# Patient Record
Sex: FEMALE
[De-identification: names, ages, dates, MRNs, and addresses within clinical notes are randomized per-mention and may not be internally consistent; named-entity substitution may affect disease eponyms.]

---

## 2021-03-29 PROBLEM — Z00.00 ENCOUNTER FOR PREVENTIVE HEALTH EXAMINATION: Status: ACTIVE | Noted: 2021-03-29

## 2021-04-02 ENCOUNTER — APPOINTMENT (OUTPATIENT)
Dept: OTOLARYNGOLOGY | Facility: CLINIC | Age: 71
End: 2021-04-02

## 2021-04-12 ENCOUNTER — APPOINTMENT (OUTPATIENT)
Dept: OTOLARYNGOLOGY | Facility: CLINIC | Age: 71
End: 2021-04-12
Payer: MEDICARE

## 2021-04-12 VITALS
HEART RATE: 92 BPM | HEIGHT: 63 IN | DIASTOLIC BLOOD PRESSURE: 88 MMHG | BODY MASS INDEX: 26.58 KG/M2 | SYSTOLIC BLOOD PRESSURE: 164 MMHG | WEIGHT: 150 LBS | TEMPERATURE: 97 F

## 2021-04-12 DIAGNOSIS — Z78.9 OTHER SPECIFIED HEALTH STATUS: ICD-10-CM

## 2021-04-12 PROCEDURE — 99204 OFFICE O/P NEW MOD 45 MIN: CPT | Mod: 25

## 2021-04-12 PROCEDURE — 31575 DIAGNOSTIC LARYNGOSCOPY: CPT

## 2021-04-12 RX ORDER — FAMOTIDINE 20 MG/1
20 TABLET, FILM COATED ORAL
Qty: 60 | Refills: 5 | Status: ACTIVE | COMMUNITY
Start: 2021-04-12 | End: 1900-01-01

## 2021-04-12 RX ORDER — PSYLLIUM HUSK 0.4 G
CAPSULE ORAL
Refills: 0 | Status: ACTIVE | COMMUNITY

## 2021-04-12 RX ORDER — MULTIVIT-MIN/FA/LYCOPEN/LUTEIN .4-300-25
TABLET ORAL
Refills: 0 | Status: ACTIVE | COMMUNITY

## 2021-04-12 RX ORDER — METOPROLOL SUCCINATE 25 MG/1
25 TABLET, EXTENDED RELEASE ORAL
Refills: 0 | Status: ACTIVE | COMMUNITY

## 2021-04-12 RX ORDER — VITAMIN B COMPLEX
CAPSULE ORAL
Refills: 0 | Status: ACTIVE | COMMUNITY

## 2021-04-12 NOTE — ASSESSMENT
[FreeTextEntry1] : Reviewed and reconciled medications, allergies, PMHx, PSHx, SocHx, FMHx.\par \par painful swallowing\par mild postcricoid edema\par \par CT neck 3/29/21: normal soft tissues, cervical spondylosis, foraminal stenosis C5-C6 bilaterally and C6-C7 on the right\par \par Plan:\par Reviewed CT results. Flexible laryngoscopy. Pepcid BID 30 minutes after other medications, before meals. FEEST in 2 weeks. FU after test.

## 2021-04-12 NOTE — PROCEDURE
[de-identified] : Procedure:  Flexible Fiberoptic Laryngoscopy: Risks, benefits, and alternatives of flexible endoscopy were explained to the patient.  The patient gave oral consent to proceed.  The flexible scope was inserted into the right nasal cavity and advanced towards the nasopharynx.  Visualized mucosa over the turbinates and septum were as described above.  The nasopharynx was clear.  Oropharyngeal walls were symmetric and mobile without lesion, mass, or edema.  Hypopharynx was also without  lesion or edema.  Larynx was mobile without lesions. Mild postcricoid edema.  True vocal folds were white without mass or lesion.  Base of tongue was within normal limits.

## 2021-04-12 NOTE — ADDENDUM
[FreeTextEntry1] : Documented by Nichol Estevez acting as scribe for Dr. Plascencia on 04/12/2021.\par \par All Medical record entries made by the Scribe were at my, Dr. Plascencia, direction and personally dictated by me on 04/12/2021 . I have reviewed the chart and agree that the record accurately reflects my personal performance of the history, physical exam, assessment and plan. I have also personally directed, reviewed, and agreed with the discharge instructions.

## 2021-04-12 NOTE — CONSULT LETTER
[Dear  ___] : Dear  [unfilled], [Consult Letter:] : I had the pleasure of evaluating your patient, [unfilled]. [Please see my note below.] : Please see my note below. [Sincerely,] : Sincerely, [Consult Closing:] : Thank you very much for allowing me to participate in the care of this patient.  If you have any questions, please do not hesitate to contact me. [FreeTextEntry3] : Tono Plascencia MD FACS

## 2021-04-12 NOTE — PHYSICAL EXAM
[Midline] : trachea located in midline position [Removed] : palatine tonsils previously removed [Normal] : no rashes [FreeTextEntry1] : open and clear bilaterally [FreeTextEntry2] : sinuses nontender to percussion  [de-identified] : sensations intact

## 2021-04-12 NOTE — HISTORY OF PRESENT ILLNESS
[de-identified] : The patient presents with painful swallowing for the past 3-4 weeks. Denies choking or trauma to the area. Denies dysphagia or change in voice. She had a carotid study done and was sent for a CT. She was having chest pain last week and reflux and a small hernia were found.

## 2021-04-12 NOTE — REVIEW OF SYSTEMS
[Throat Pain] : throat pain [Throat Dryness] : throat dryness [Throat Itching] : throat itching [Swelling Neck] : swelling neck [Swelling Face] : face swelling [Eyes Itch] : itching of the eyes [Swollen Glands] : swollen glands [Negative] : Endocrine [FreeTextEntry7] : difficulty swallowing

## 2021-04-26 ENCOUNTER — APPOINTMENT (OUTPATIENT)
Dept: OTOLARYNGOLOGY | Facility: CLINIC | Age: 71
End: 2021-04-26
Payer: MEDICARE

## 2021-04-26 PROCEDURE — 92612 ENDOSCOPY SWALLOW (FEES) VID: CPT | Mod: GN

## 2021-05-05 ENCOUNTER — APPOINTMENT (OUTPATIENT)
Dept: OTOLARYNGOLOGY | Facility: CLINIC | Age: 71
End: 2021-05-05
Payer: MEDICARE

## 2021-05-05 VITALS
TEMPERATURE: 97.2 F | DIASTOLIC BLOOD PRESSURE: 78 MMHG | BODY MASS INDEX: 26.58 KG/M2 | SYSTOLIC BLOOD PRESSURE: 174 MMHG | HEART RATE: 81 BPM | HEIGHT: 63 IN | WEIGHT: 150 LBS

## 2021-05-05 DIAGNOSIS — J31.0 CHRONIC RHINITIS: ICD-10-CM

## 2021-05-05 DIAGNOSIS — K21.9 GASTRO-ESOPHAGEAL REFLUX DISEASE W/OUT ESOPHAGITIS: ICD-10-CM

## 2021-05-05 DIAGNOSIS — R13.10 DYSPHAGIA, UNSPECIFIED: ICD-10-CM

## 2021-05-05 DIAGNOSIS — R07.0 PAIN IN THROAT: ICD-10-CM

## 2021-05-05 DIAGNOSIS — M54.12 RADICULOPATHY, CERVICAL REGION: ICD-10-CM

## 2021-05-05 PROCEDURE — 99214 OFFICE O/P EST MOD 30 MIN: CPT

## 2021-05-05 NOTE — HISTORY OF PRESENT ILLNESS
[de-identified] : The patient presents with h/o painful swallowing and reflux. She hasn't been taking the Pepcid regularly. She hasn't been following the reflux diet. She is still getting painful swallowing and she now has excess mucus. \par

## 2021-05-05 NOTE — CONSULT LETTER
[Dear  ___] : Dear  [unfilled], [Courtesy Letter:] : I had the pleasure of seeing your patient, [unfilled], in my office today. [Please see my note below.] : Please see my note below. [Consult Closing:] : Thank you very much for allowing me to participate in the care of this patient.  If you have any questions, please do not hesitate to contact me. [Sincerely,] : Sincerely, [FreeTextEntry3] : Tono Plascencia MD FACS

## 2021-05-05 NOTE — ADDENDUM
[FreeTextEntry1] : Documented by Nichol Estevez acting as scribe for Dr. Plascencia on 05/05/2021.\par \par All Medical record entries made by the Scribe were at my, Dr. Plascencia, direction and personally dictated by me on 05/05/2021 . I have reviewed the chart and agree that the record accurately reflects my personal performance of the history, physical exam, assessment and plan. I have also personally directed, reviewed, and agreed with the discharge instructions.

## 2021-05-05 NOTE — ASSESSMENT
[FreeTextEntry1] : Reviewed and reconciled medications, allergies, PMHx, PSHx, SocHx, FMHx.\par \par h/o painful swallowing and reflux\par excess mucus\par \par CT neck 3/29/21: normal soft tissues, cervical spondylosis, foraminal stenosis C5-C6 bilaterally and C6-C7 on the right\par \par FEEST 4/26/21: evidence of intermittent, trace LPR extending into the right pyriform sinus\par \par Plan:\par Reviewed CT and FEEST results. Reflux diet- no eating 2 hours before bed. Pepcid BID 30 minutes after other medications, before meals. MBS in 3 weeks if no improvement. FU after test.

## 2024-04-18 ENCOUNTER — APPOINTMENT (OUTPATIENT)
Dept: ORTHOPEDIC SURGERY | Facility: CLINIC | Age: 74
End: 2024-04-18
Payer: MEDICARE

## 2024-04-18 VITALS — BODY MASS INDEX: 26.58 KG/M2 | HEIGHT: 63 IN | WEIGHT: 150 LBS

## 2024-04-18 DIAGNOSIS — Z78.9 OTHER SPECIFIED HEALTH STATUS: ICD-10-CM

## 2024-04-18 DIAGNOSIS — M43.16 SPONDYLOLISTHESIS, LUMBAR REGION: ICD-10-CM

## 2024-04-18 PROCEDURE — 72170 X-RAY EXAM OF PELVIS: CPT

## 2024-04-18 PROCEDURE — 72110 X-RAY EXAM L-2 SPINE 4/>VWS: CPT

## 2024-04-18 PROCEDURE — 99204 OFFICE O/P NEW MOD 45 MIN: CPT

## 2024-04-18 RX ORDER — TIZANIDINE 4 MG/1
4 TABLET ORAL TWICE DAILY
Qty: 20 | Refills: 0 | Status: ACTIVE | COMMUNITY
Start: 2024-04-18 | End: 1900-01-01

## 2024-04-18 RX ORDER — METHYLPREDNISOLONE 4 MG/1
4 TABLET ORAL
Qty: 1 | Refills: 0 | Status: ACTIVE | COMMUNITY
Start: 2024-04-18 | End: 1900-01-01

## 2024-04-22 NOTE — ASSESSMENT
[FreeTextEntry1] : 74 yo F presenting with acute low back and RLE radicular pain. XR with L4-5 spondylolisthesis. Will start PT. TPI offered and deferred. Prescribing tizanidine and mdp - unable to tolerate nsaids, defers narcotics and tpi. If symptoms persist despite this will get MRI LS for further eval Progress note completed by Lavelle Goncalves MD

## 2024-04-22 NOTE — HISTORY OF PRESENT ILLNESS
[Lower back] : lower back [8] : 8 [Burning] : burning [Radiating] : radiating [Shooting] : shooting [Throbbing] : throbbing [Tightness] : tightness [Constant] : constant [Nothing helps with pain getting better] : Nothing helps with pain getting better [de-identified] : 74 yo F presenting with back and RLE pain x 3 weeks, no injury. Pain radiates with posterior buttock pain, posteriolateral leg pain to ankle with n/t. Pain with standing/walking to long. Getting out of bed aggravates. Tried salon pas. Topicals. advil/tylenol without relief. NO prior PT injections or spinal surgeries.  [] : no [FreeTextEntry5] : 72 yo F presenting with low back pain that radiates down right leg. Started 3 weeks ago; gotten worse over the past 3 days ago.  [FreeTextEntry7] : right leg

## 2024-04-22 NOTE — IMAGING
[Spondylolithesis] : Spondylolithesis [de-identified] : L lumbar paraspinal muscle tenderness L lumbar paraspinal muscle spasm   pain with flexion radicular pain with extension diminished ROM in all planes   motor exam 5/5 strength bilaterally sensory intact + R SLR R [id number] of pain   ambulates without assistive device non antalgic gait able to heel/toe walk      [FreeTextEntry1] : L4-5 spondylolisthesis [de-identified] : negative

## 2024-05-10 ENCOUNTER — APPOINTMENT (OUTPATIENT)
Dept: PAIN MANAGEMENT | Facility: CLINIC | Age: 74
End: 2024-05-10

## 2024-05-13 ENCOUNTER — APPOINTMENT (OUTPATIENT)
Dept: PAIN MANAGEMENT | Facility: CLINIC | Age: 74
End: 2024-05-13
Payer: MEDICARE

## 2024-05-13 VITALS — WEIGHT: 156 LBS | HEIGHT: 63 IN | BODY MASS INDEX: 27.64 KG/M2

## 2024-05-13 PROCEDURE — 99214 OFFICE O/P EST MOD 30 MIN: CPT | Mod: 25

## 2024-05-13 PROCEDURE — J3490M: CUSTOM | Mod: NC

## 2024-05-13 PROCEDURE — 20552 NJX 1/MLT TRIGGER POINT 1/2: CPT

## 2024-05-13 NOTE — DISCUSSION/SUMMARY
[de-identified] : Patient is presenting with acute/sub-acute radicular pain with impairment in ADLs and functionality.  The pain has not responded to  conservative care including nsaid therapy and/or physical therapy.   will proceed with mri and tpi today

## 2024-05-13 NOTE — PROCEDURE
[FreeTextEntry3] : Trigger Point was performed because of pain inflammation Anesthesia: ethyl chloride sprayed topically.: Lidocaine .1% 2 cc Marcaine:.25% 2cc  kenalog 10mg/cc 2cc :Needle size: 25 gauge 1 1/2inch.  Medication was injected in the right paraspinal muscles . Patient has tried OTC's including aspirin, Ibuprofen, Aleve etc or prescription NSAIDS, and/or exercises at home and/ or physical therapy without satisfactory response After verbal consent using sterile preparation and technique.The risks, benefits, and alternatives to cortisone injection were explained in full to the patient. Risks outlined include but are not limited to infection, sepsis, bleeding, scarring, skin discoloration, temporary increase in pain, syncopal episode, failure to resolve symptoms, allergic reaction, symptom recurrence, and elevation of blood sugar in diabetics. Patient understood the risks. All questions were answered. After discussion of options, patient requested an injection. Oral informed consent was obtained and sterile prep was done of the injection site. Sterile technique was utilized for the procedure including the preparation of the solutions used for the injection. Patient tolerated the procedure well. Advised to ice the injection site this evening.  Sterile technique used Prep with alcohol locally to site.

## 2024-05-13 NOTE — PHYSICAL EXAM
[de-identified] : PHYSICAL EXAM  Constitutional:  Appears well, no apparent distress Ability to communicate: Normal Respiratory: non-labored breathing Skin: no rash noted Head: normocephalic, atraumatic Neck: no visible thyroid enlargement Eyes: extraocular movements intact Neurologic: alert and oriented x3 Psychiatric: normal mood, affect, and behavior  Lumbar Spine:  Palpation: right lumbar paraspinal spasm and right lumbar paraspinal tenderness to palpation. ROM: Diminished range of motion in all plains.  Patient notes pain with lateral bending to the right. MMT: Motor exam is 5/5 through out bilateral lower extremities. Sensation: Light touch and pain is intact throughout bilateral lower extremities. Reflexes: achilles and patella reflexes are intact and  symmetrical.  No sustained clonus. Special Testing: Positive straight leg raise on the right side.  Assessemnt: Radiculopathy of lumbosacral region (M54.17) Myalgia (M79.10)  Plan: After discussing various treatment options with the patient including but not limited to oral medications, physical therapy, exercise modalities as well as interventional spinal injections, we have decided with the following plan: The patient is presenting with acute/sub-acute radicular pain with impairment in ADLs and functionality.  The pain has not responded to conservative care including NSAID therapy and/or physical therapy.  There is no bleeding tendency, unstable medical condition, or systemic infection  The risks, benefits and alternatives of the proposed procedure were explained in detail with the patient.  The risks outlined include but are not limited to infection, bleeding, post dural puncture headache, nerve injury, a temporary increase in pain, failure to resolve symptoms, allergic reaction, symptom recurrence, and possible elevation of blood sugar.  All questions were answered to patient's satisfaction and he/she verbalized an understanding.  Follow up 1-2 weeks post injection foe re-evaluation.  Continue home exercises, stretching, activity modification, physical therapy, and conservative care.

## 2024-05-13 NOTE — HISTORY OF PRESENT ILLNESS
[FreeTextEntry1] : pt presents w/ lspine pain radiating into right le [Lower back] : lower back [10] : 10 [9] : 9 [Burning] : burning [Dull/Aching] : dull/aching [Shooting] : shooting [Throbbing] : throbbing [Tightness] : tightness [Tingling] : tingling [Constant] : constant [Sleep] : sleep [Ice] : ice [Sitting] : sitting [Standing] : standing [Walking] : walking [Stairs] : stairs [Lying in bed] : lying in bed [] : no [FreeTextEntry7] : b/l legs [de-identified] : getting up in the morning

## 2024-05-16 ENCOUNTER — NON-APPOINTMENT (OUTPATIENT)
Age: 74
End: 2024-05-16

## 2024-05-21 RX ORDER — TRAMADOL HYDROCHLORIDE 200 MG/1
200 TABLET, EXTENDED RELEASE ORAL
Qty: 7 | Refills: 0 | Status: ACTIVE | COMMUNITY
Start: 2024-05-21 | End: 1900-01-01

## 2024-05-21 RX ORDER — TRAMADOL HYDROCHLORIDE 50 MG/1
50 TABLET, COATED ORAL
Qty: 28 | Refills: 0 | Status: ACTIVE | COMMUNITY
Start: 2024-05-13 | End: 1900-01-01

## 2024-05-25 ENCOUNTER — APPOINTMENT (OUTPATIENT)
Dept: PAIN MANAGEMENT | Facility: CLINIC | Age: 74
End: 2024-05-25

## 2024-05-30 ENCOUNTER — APPOINTMENT (OUTPATIENT)
Dept: ORTHOPEDIC SURGERY | Facility: CLINIC | Age: 74
End: 2024-05-30

## 2024-05-30 ENCOUNTER — APPOINTMENT (OUTPATIENT)
Dept: PAIN MANAGEMENT | Facility: CLINIC | Age: 74
End: 2024-05-30
Payer: MEDICARE

## 2024-05-30 PROCEDURE — 64483 NJX AA&/STRD TFRM EPI L/S 1: CPT | Mod: RT

## 2024-05-30 PROCEDURE — 64484 NJX AA&/STRD TFRM EPI L/S EA: CPT | Mod: 59,RT

## 2024-05-30 NOTE — PROCEDURE
[FreeTextEntry3] : Date of Service: 05/30/2024   Account: [id number]   Patient: PORFIRIO ISLAS   YOB: 1950   Age: 74 year     Surgeon: Binh Dalal M.D.   Assistant: None.   Pre-Operative Diagnosis: Lumbosacral radiculitis   Post Operative Diagnosis: Lumbosacral radiculitis   Procedure: Right L4-5, L5-S1 transforaminal epidural steroid injection under fluoroscopic guidance        This procedure was carried out using fluoroscopic guidance.  The risks and benefits of the procedure were discussed extensively with the patient.  The consent of the patient was obtained and the following procedure was performed. The patient was placed in the prone position on the fluoroscopic table and the lumbar area was prepped and draped in a sterile fashion.   The right L4-5 and L5-S1 neural foramen were identified on right oblique  "jatin dog" anatomical view at the 6 o' clock position using fluoroscopic guidance, and the area was marked. The overlying skin and subcutaneous structures were anesthetized using sterile technique with 1% Lidocaine.  A 22 gauge spinal needle was directed toward the inferior (6 o'clock) position of the pedicle, which formed the roof of the identified foramen.  Once in the epidural space, after negative aspiration for heme and CSF, 1cc of Omnipaque contrast was injected to confirm epidural location and assess filling defects and rule out intravascular needle placement.   The following contrast flow and epidurogram was observed: no intravascular or intrathecal flow pattern was noted.  No blood or CSF was aspirated. Omnipaque spread appeared to outline the right L4 and L5 nerve roots and spread medially into the epidural space.   After this, an injectate of 3 cc preservative free normal saline plus 40 mg of kenalog was injected in the epidural space at each of the two levels.   The needle was subsequently removed.  Vital signs remained normal.  Pulse oximeter was used throughout the procedure and the patient's pulse and oxygen saturation remained within normal limits.  The patient tolerated the procedure well.  There were no complications.  The patient was instructed to apply ice over the injection sites for twenty minutes every two hours for the next 24 to 48 hours.  The patient was also instructed to contact me immediately if there were any problems.     Binh Dalal M.D.

## 2024-06-19 ENCOUNTER — APPOINTMENT (OUTPATIENT)
Dept: PAIN MANAGEMENT | Facility: CLINIC | Age: 74
End: 2024-06-19
Payer: MEDICARE

## 2024-06-19 VITALS — BODY MASS INDEX: 27.64 KG/M2 | WEIGHT: 156 LBS | HEIGHT: 63 IN

## 2024-06-19 DIAGNOSIS — M54.16 RADICULOPATHY, LUMBAR REGION: ICD-10-CM

## 2024-06-19 PROCEDURE — 99213 OFFICE O/P EST LOW 20 MIN: CPT

## 2024-06-19 RX ORDER — TIZANIDINE 2 MG/1
2 TABLET ORAL
Qty: 60 | Refills: 0 | Status: ACTIVE | COMMUNITY
Start: 2024-06-19 | End: 1900-01-01

## 2024-06-19 RX ORDER — GABAPENTIN 300 MG/1
300 CAPSULE ORAL TWICE DAILY
Qty: 60 | Refills: 0 | Status: ACTIVE | COMMUNITY
Start: 2024-05-21 | End: 1900-01-01

## 2024-06-19 NOTE — PHYSICAL EXAM
[de-identified] : PHYSICAL EXAM  Constitutional:  Appears well, no apparent distress Ability to communicate: Normal Respiratory: non-labored breathing Skin: no rash noted Head: normocephalic, atraumatic Neck: no visible thyroid enlargement Eyes: extraocular movements intact Neurologic: alert and oriented x3 Psychiatric: normal mood, affect, and behavior  Lumbar Spine:  Palpation: right lumbar paraspinal spasm and right lumbar paraspinal tenderness to palpation. ROM: Diminished range of motion in all plains.  Patient notes pain with lateral bending to the right. MMT: Motor exam is 5/5 through out bilateral lower extremities. Sensation: Light touch and pain is intact throughout bilateral lower extremities. Reflexes: achilles and patella reflexes are intact and  symmetrical.  No sustained clonus. Special Testing: Positive straight leg raise on the right side.  Assessemnt: Radiculopathy of lumbosacral region (M54.17) Myalgia (M79.10)  Plan: After discussing various treatment options with the patient including but not limited to oral medications, physical therapy, exercise modalities as well as interventional spinal injections, we have decided with the following plan: The patient is presenting with acute/sub-acute radicular pain with impairment in ADLs and functionality.  The pain has not responded to conservative care including NSAID therapy and/or physical therapy.  There is no bleeding tendency, unstable medical condition, or systemic infection  The risks, benefits and alternatives of the proposed procedure were explained in detail with the patient.  The risks outlined include but are not limited to infection, bleeding, post dural puncture headache, nerve injury, a temporary increase in pain, failure to resolve symptoms, allergic reaction, symptom recurrence, and possible elevation of blood sugar.  All questions were answered to patient's satisfaction and he/she verbalized an understanding.  Follow up 1-2 weeks post injection foe re-evaluation.  Continue home exercises, stretching, activity modification, physical therapy, and conservative care.

## 2024-06-19 NOTE — HISTORY OF PRESENT ILLNESS
[Lower back] : lower back [10] : 10 [9] : 9 [Burning] : burning [Dull/Aching] : dull/aching [Shooting] : shooting [Throbbing] : throbbing [Tightness] : tightness [Tingling] : tingling [Constant] : constant [Sleep] : sleep [Ice] : ice [Sitting] : sitting [Standing] : standing [Walking] : walking [Stairs] : stairs [Lying in bed] : lying in bed [FreeTextEntry1] : RT L4-5,L5-S1 TFESI- 5/30/2024; pt is experiencing cramping in ankles and toes at night post procedure  [4] : 4 [3] : 3 [Intermittent] : intermittent [] : no [FreeTextEntry7] : b/l legs [FreeTextEntry6] : cramping [de-identified] : getting up in the morning

## 2024-06-19 NOTE — DISCUSSION/SUMMARY
[de-identified] : Pt. presents with 80% Relief of pain and improvement in ROM and ADLS post injection.  Able to sit, stand, walk, sleep for longer periods of time.  continue hep tizanadine and gabapentin

## 2024-09-19 ENCOUNTER — APPOINTMENT (OUTPATIENT)
Dept: PAIN MANAGEMENT | Facility: CLINIC | Age: 74
End: 2024-09-19
Payer: MEDICARE

## 2024-09-19 VITALS — HEIGHT: 63 IN | WEIGHT: 156 LBS | BODY MASS INDEX: 27.64 KG/M2

## 2024-09-19 DIAGNOSIS — M54.16 RADICULOPATHY, LUMBAR REGION: ICD-10-CM

## 2024-09-19 PROCEDURE — 99213 OFFICE O/P EST LOW 20 MIN: CPT

## 2024-09-19 NOTE — ASSESSMENT
[FreeTextEntry1] : R NAKIA 5/30/24 with 75% relief improved rom and adls sustained > 3 months  pain is starting to return

## 2024-09-19 NOTE — HISTORY OF PRESENT ILLNESS
[Lower back] : lower back [5] : 5 [3] : 3 [Dull/Aching] : dull/aching [Throbbing] : throbbing [Intermittent] : intermittent [Sleep] : sleep [Ice] : ice [Sitting] : sitting [Standing] : standing [Walking] : walking [Stairs] : stairs [Lying in bed] : lying in bed [FreeTextEntry1] : RT L4-5,L5-S1 TFESI- 5/30/2024; pt is experiencing cramping in ankles and toes at night post procedure  [] : no [FreeTextEntry6] : cramping [FreeTextEntry7] : right side into the leg  [de-identified] : getting up in the morning

## 2024-09-19 NOTE — PHYSICAL EXAM
[de-identified] : PHYSICAL EXAM  Constitutional:  Appears well, no apparent distress Ability to communicate: Normal Respiratory: non-labored breathing Skin: no rash noted Head: normocephalic, atraumatic Neck: no visible thyroid enlargement Eyes: extraocular movements intact Neurologic: alert and oriented x3 Psychiatric: normal mood, affect, and behavior  Lumbar Spine:  Palpation: right lumbar paraspinal spasm and right lumbar paraspinal tenderness to palpation. ROM: Diminished range of motion in all plains.  Patient notes pain with lateral bending to the right. MMT: Motor exam is 5/5 through out bilateral lower extremities. Sensation: Light touch and pain is intact throughout bilateral lower extremities. Reflexes: achilles and patella reflexes are intact and  symmetrical.  No sustained clonus. Special Testing: Positive straight leg raise on the right side.  Assessemnt: Radiculopathy of lumbosacral region (M54.17) Myalgia (M79.10)  Plan: After discussing various treatment options with the patient including but not limited to oral medications, physical therapy, exercise modalities as well as interventional spinal injections, we have decided with the following plan: The patient is presenting with acute/sub-acute radicular pain with impairment in ADLs and functionality.  The pain has not responded to conservative care including NSAID therapy and/or physical therapy.  There is no bleeding tendency, unstable medical condition, or systemic infection  The risks, benefits and alternatives of the proposed procedure were explained in detail with the patient.  The risks outlined include but are not limited to infection, bleeding, post dural puncture headache, nerve injury, a temporary increase in pain, failure to resolve symptoms, allergic reaction, symptom recurrence, and possible elevation of blood sugar.  All questions were answered to patient's satisfaction and he/she verbalized an understanding.  Follow up 1-2 weeks post injection foe re-evaluation.  Continue home exercises, stretching, activity modification, physical therapy, and conservative care.

## 2025-02-10 ENCOUNTER — APPOINTMENT (OUTPATIENT)
Dept: PAIN MANAGEMENT | Facility: CLINIC | Age: 75
End: 2025-02-10
Payer: MEDICARE

## 2025-02-10 VITALS — BODY MASS INDEX: 27.64 KG/M2 | HEIGHT: 63 IN | WEIGHT: 156 LBS

## 2025-02-10 DIAGNOSIS — M54.16 RADICULOPATHY, LUMBAR REGION: ICD-10-CM

## 2025-02-10 PROCEDURE — 99214 OFFICE O/P EST MOD 30 MIN: CPT
